# Patient Record
Sex: FEMALE | Race: WHITE | NOT HISPANIC OR LATINO
[De-identification: names, ages, dates, MRNs, and addresses within clinical notes are randomized per-mention and may not be internally consistent; named-entity substitution may affect disease eponyms.]

---

## 2017-04-03 PROBLEM — Z00.00 ENCOUNTER FOR PREVENTIVE HEALTH EXAMINATION: Status: ACTIVE | Noted: 2017-04-03

## 2017-04-06 ENCOUNTER — APPOINTMENT (OUTPATIENT)
Dept: PEDIATRIC ALLERGY IMMUNOLOGY | Facility: CLINIC | Age: 32
End: 2017-04-06

## 2017-04-06 ENCOUNTER — LABORATORY RESULT (OUTPATIENT)
Age: 32
End: 2017-04-06

## 2017-04-06 VITALS
DIASTOLIC BLOOD PRESSURE: 49 MMHG | SYSTOLIC BLOOD PRESSURE: 90 MMHG | HEIGHT: 65.75 IN | HEART RATE: 75 BPM | WEIGHT: 125 LBS | BODY MASS INDEX: 20.33 KG/M2

## 2017-04-17 LAB
A ALTERNATA IGE QN: <0.1 KUA/L
A FUMIGATUS IGE QN: <0.1 KUA/L
AMER BEECH IGE QN: 0
BERMUDA GRASS IGE QN: <0.1 KUA/L
BOXELDER IGE QN: <0.1 KUA/L
C ALBICANS IGE QN: <0.1 KUA/L
C HERBARUM IGE QN: <0.1 KUA/L
C LUNATA IGE QN: NORMAL
CAT DANDER IGE QN: <0.1 KUA/L
CEPHALOSPORIN IGE QN: 0
CEPHALOSPORIN IGE QN: <0.1 KUA/L
CMN PIGWEED IGE QN: <0.1 KUA/L
COCKLEBUR IGE QN: NORMAL
COCKSFOOT IGE QN: <0.1 KUA/L
COMMON RAGWEED IGE QN: <0.1 KUA/L
COTTONWOOD IGE QN: <0.1 KUA/L
D FARINAE IGE QN: <0.1 KUA/L
D PTERONYSS IGE QN: <0.1 KUA/L
DEPRECATED A ALTERNATA IGE RAST QL: 0
DEPRECATED A FUMIGATUS IGE RAST QL: 0
DEPRECATED A PULLULANS IGE RAST QL: NORMAL
DEPRECATED AMER BEECH IGE RAST QL: <0.1 KUA/L
DEPRECATED BERMUDA GRASS IGE RAST QL: 0
DEPRECATED BOXELDER IGE RAST QL: 0
DEPRECATED C ALBICANS IGE RAST QL: 0
DEPRECATED C HERBARUM IGE RAST QL: 0
DEPRECATED C LUNATA IGE RAST QL: NORMAL
DEPRECATED CAT DANDER IGE RAST QL: 0
DEPRECATED COCKLEBUR IGE RAST QL: NORMAL
DEPRECATED COCKSFOOT IGE RAST QL: 0
DEPRECATED COMMON PIGWEED IGE RAST QL: 0
DEPRECATED COMMON RAGWEED IGE RAST QL: 0
DEPRECATED COTTONWOOD IGE RAST QL: 0
DEPRECATED D FARINAE IGE RAST QL: 0
DEPRECATED D PTERONYSS IGE RAST QL: 0
DEPRECATED DOG DANDER IGE RAST QL: 0
DEPRECATED ENGL PLANTAIN IGE RAST QL: 0
DEPRECATED F MONILIFORME IGE RAST QL: NORMAL
DEPRECATED GIANT RAGWEED IGE RAST QL: 0
DEPRECATED GOOSEFOOT IGE RAST QL: 0
DEPRECATED JOHNSON GRASS IGE RAST QL: 0
DEPRECATED KENT BLUE GRASS IGE RAST QL: 0
DEPRECATED LONDON PLANE IGE RAST QL: NORMAL
DEPRECATED M RACEMOSUS IGE RAST QL: 0
DEPRECATED MEADOW FESCUE IGE RAST QL: 0
DEPRECATED MUGWORT IGE RAST QL: 0
DEPRECATED P NOTATUM IGE RAST QL: 0
DEPRECATED R NIGRICANS IGE RAST QL: NORMAL
DEPRECATED RED CEDAR IGE RAST QL: NORMAL
DEPRECATED RED TOP GRASS IGE RAST QL: 0
DEPRECATED ROACH IGE RAST QL: 0
DEPRECATED RYE IGE RAST QL: 0
DEPRECATED S ROSTRATA IGE RAST QL: 0
DEPRECATED SILVER BIRCH IGE RAST QL: 0
DEPRECATED SW VERNAL GRASS IGE RAST QL: 0
DEPRECATED TIMOTHY IGE RAST QL: 0
DEPRECATED WHITE ASH IGE RAST QL: 0
DEPRECATED WHITE HICKORY IGE RAST QL: NORMAL
DEPRECATED WHITE OAK IGE RAST QL: 0
DOG DANDER IGE QN: <0.1 KUA/L
ENGL PLANTAIN IGE QN: <0.1 KUA/L
F MONILIFORME IGE QN: NORMAL
GIANT RAGWEED IGE QN: <0.1 KUA/L
GOOSEFOOT IGE QN: <0.1 KUA/L
JOHNSON GRASS IGE QN: <0.1 KUA/L
KENT BLUE GRASS IGE QN: <0.1 KUA/L
LONDON PLANE IGE QN: NORMAL
M RACEMOSUS IGE QN: <0.1 KUA/L
MEADOW FESCUE IGE QN: <0.1 KUA/L
MOLD (AUREOBASIDIUM M12) CONC: NORMAL
MUGWORT IGE QN: <0.1 KUA/L
MULBERRY (T70) CLASS: NORMAL
MULBERRY (T70) CONC: NORMAL
P NOTATUM IGE QN: <0.1 KUA/L
R NIGRICANS IGE QN: NORMAL
RED CEDAR IGE QN: NORMAL
RED TOP GRASS IGE QN: <0.1 KUA/L
ROACH IGE QN: <0.1 KUA/L
RYE IGE QN: <0.1 KUA/L
S ROSTRATA IGE QN: <0.1 KUA/L
SILVER BIRCH IGE QN: <0.1 KUA/L
SW VERNAL GRASS IGE QN: <0.1 KUA/L
TIMOTHY IGE QN: <0.1 KUA/L
TOTAL IGE SMQN RAST: 13 KU/L
WHITE ASH IGE QN: <0.1 KUA/L
WHITE ELM IGE QN: 0
WHITE ELM IGE QN: <0.1 KUA/L
WHITE HICKORY IGE QN: NORMAL
WHITE OAK IGE QN: <0.1 KUA/L

## 2017-07-27 ENCOUNTER — RX RENEWAL (OUTPATIENT)
Age: 32
End: 2017-07-27

## 2017-07-27 RX ORDER — FLUTICASONE PROPIONATE 50 UG/1
50 SPRAY, METERED NASAL DAILY
Qty: 1 | Refills: 3 | Status: ACTIVE | COMMUNITY
Start: 2017-04-06 | End: 1900-01-01

## 2017-07-27 RX ORDER — KETOTIFEN FUMARATE 0.25 MG/ML
0.03 SOLUTION/ DROPS OPHTHALMIC
Qty: 5 | Refills: 0 | Status: ACTIVE | COMMUNITY
Start: 2017-04-06 | End: 1900-01-01

## 2021-11-03 ENCOUNTER — APPOINTMENT (OUTPATIENT)
Dept: OTOLARYNGOLOGY | Facility: CLINIC | Age: 36
End: 2021-11-03
Payer: MEDICAID

## 2021-11-03 VITALS
OXYGEN SATURATION: 98 % | TEMPERATURE: 98.7 F | HEART RATE: 87 BPM | HEIGHT: 65.75 IN | SYSTOLIC BLOOD PRESSURE: 96 MMHG | BODY MASS INDEX: 20.33 KG/M2 | DIASTOLIC BLOOD PRESSURE: 66 MMHG | WEIGHT: 125 LBS

## 2021-11-03 DIAGNOSIS — H91.90 UNSPECIFIED HEARING LOSS, UNSPECIFIED EAR: ICD-10-CM

## 2021-11-03 DIAGNOSIS — J31.0 CHRONIC RHINITIS: ICD-10-CM

## 2021-11-03 DIAGNOSIS — R09.81 NASAL CONGESTION: ICD-10-CM

## 2021-11-03 PROCEDURE — 31575 DIAGNOSTIC LARYNGOSCOPY: CPT

## 2021-11-03 PROCEDURE — 99204 OFFICE O/P NEW MOD 45 MIN: CPT | Mod: 25

## 2021-11-03 RX ORDER — FAMOTIDINE 40 MG/1
40 TABLET, FILM COATED ORAL
Qty: 30 | Refills: 3 | Status: ACTIVE | COMMUNITY
Start: 2021-11-03 | End: 1900-01-01

## 2021-11-03 RX ORDER — LEVOCETIRIZINE DIHYDROCHLORIDE 5 MG/1
5 TABLET ORAL DAILY
Qty: 1 | Refills: 3 | Status: ACTIVE | COMMUNITY
Start: 2021-11-03 | End: 1900-01-01

## 2021-11-03 RX ORDER — PANTOPRAZOLE 40 MG/1
40 TABLET, DELAYED RELEASE ORAL
Qty: 30 | Refills: 3 | Status: ACTIVE | COMMUNITY
Start: 2021-11-03 | End: 1900-01-01

## 2021-11-03 RX ORDER — FLUTICASONE PROPIONATE 50 UG/1
50 SPRAY, METERED NASAL TWICE DAILY
Qty: 1 | Refills: 1 | Status: ACTIVE | COMMUNITY
Start: 2021-11-03 | End: 1900-01-01

## 2021-11-03 NOTE — HISTORY OF PRESENT ILLNESS
[FreeTextEntry1] : Patient feels like she is having difficulty breathing. She had constant congestion as a child which has improved with eliminating nuts and mild. but continues to have some congestion. Patient reports heavy breathing when laying down and loosing her breath while  speaking.  No hoarseness of her voice. Has had FB sensation after eating and feels she needs to spit her saliva after eating sometimes.  She also c/o some diminished hearing.

## 2021-11-03 NOTE — PROCEDURE
[Normal] : posterior cricoid area had healthy pink mucosa in the interarytenoid area and the esophageal inlet [Flexible Endoscope] : examined with the flexible endoscope [True Vocal Cords Erythematous] : bilateral true vocal cord edema [Glottis Arytenoid Cartilages Erythema] : bilateral arytenoid ~M erythema [Arytenoid Erythema ___ /4] : arytenoid erythema [unfilled]U/4

## 2021-12-15 ENCOUNTER — APPOINTMENT (OUTPATIENT)
Dept: OTOLARYNGOLOGY | Facility: CLINIC | Age: 36
End: 2021-12-15